# Patient Record
Sex: MALE | Race: WHITE | NOT HISPANIC OR LATINO | Employment: OTHER | URBAN - NONMETROPOLITAN AREA
[De-identification: names, ages, dates, MRNs, and addresses within clinical notes are randomized per-mention and may not be internally consistent; named-entity substitution may affect disease eponyms.]

---

## 2022-10-14 ENCOUNTER — HOSPITAL ENCOUNTER (EMERGENCY)
Facility: HOSPITAL | Age: 42
Discharge: HOME | End: 2022-10-14
Attending: EMERGENCY MEDICINE
Payer: COMMERCIAL

## 2022-10-14 ENCOUNTER — APPOINTMENT (OUTPATIENT)
Dept: RADIOLOGY | Facility: HOSPITAL | Age: 42
End: 2022-10-14
Payer: COMMERCIAL

## 2022-10-14 VITALS
HEART RATE: 63 BPM | OXYGEN SATURATION: 99 % | BODY MASS INDEX: 36.67 KG/M2 | TEMPERATURE: 98.5 F | DIASTOLIC BLOOD PRESSURE: 81 MMHG | HEIGHT: 71 IN | WEIGHT: 261.91 LBS | RESPIRATION RATE: 18 BRPM | SYSTOLIC BLOOD PRESSURE: 131 MMHG

## 2022-10-14 DIAGNOSIS — S61.215A LACERATION OF LEFT RING FINGER WITHOUT FOREIGN BODY WITHOUT DAMAGE TO NAIL, INITIAL ENCOUNTER: ICD-10-CM

## 2022-10-14 DIAGNOSIS — S61.213A LACERATION OF LEFT MIDDLE FINGER WITHOUT FOREIGN BODY WITHOUT DAMAGE TO NAIL, INITIAL ENCOUNTER: ICD-10-CM

## 2022-10-14 DIAGNOSIS — S61.221A LACERATION OF LEFT INDEX FINGER WITH FOREIGN BODY WITHOUT DAMAGE TO NAIL, INITIAL ENCOUNTER: ICD-10-CM

## 2022-10-14 DIAGNOSIS — S61.229A: Primary | ICD-10-CM

## 2022-10-14 PROCEDURE — 99283 EMERGENCY DEPT VISIT LOW MDM: CPT

## 2022-10-14 PROCEDURE — 12041 INTMD RPR N-HF/GENIT 2.5CM/<: CPT | Performed by: EMERGENCY MEDICINE

## 2022-10-14 PROCEDURE — 99283 EMERGENCY DEPT VISIT LOW MDM: CPT | Performed by: EMERGENCY MEDICINE

## 2022-10-14 PROCEDURE — 73130 X-RAY EXAM OF HAND: CPT

## 2022-10-14 RX ORDER — CEPHALEXIN 500 MG/1
500 CAPSULE ORAL EVERY 6 HOURS SCHEDULED
Qty: 20 CAPSULE | Refills: 0 | Status: SHIPPED | OUTPATIENT
Start: 2022-10-14 | End: 2022-10-19

## 2022-10-14 RX ORDER — BUPIVACAINE HYDROCHLORIDE 2.5 MG/ML
10 INJECTION, SOLUTION EPIDURAL; INFILTRATION; INTRACAUDAL ONCE
Status: COMPLETED | OUTPATIENT
Start: 2022-10-14 | End: 2022-10-14

## 2022-10-14 RX ORDER — LIDOCAINE HYDROCHLORIDE 10 MG/ML
10 INJECTION, SOLUTION EPIDURAL; INFILTRATION; INTRACAUDAL; PERINEURAL ONCE
Status: COMPLETED | OUTPATIENT
Start: 2022-10-14 | End: 2022-10-14

## 2022-10-14 RX ADMIN — LIDOCAINE HYDROCHLORIDE 10 ML: 10 INJECTION, SOLUTION EPIDURAL; INFILTRATION; INTRACAUDAL; PERINEURAL at 12:11

## 2022-10-14 RX ADMIN — BUPIVACAINE HYDROCHLORIDE 10 ML: 2.5 INJECTION, SOLUTION EPIDURAL; INFILTRATION; INTRACAUDAL; PERINEURAL at 12:11

## 2022-10-14 NOTE — ED PROVIDER NOTES
History  Chief Complaint   Patient presents with   • Laceration     Pt arrives reporting approx 30 min ago he was using circular saw and sustained laceration to right first 3 fingers  Pt denies amputation  Pt tetanus up to date  HPI  42M right hand dominant presenting with finger lacerations  He was using circular saw when the saw backed up and he sustained lacerations to his index, middle, and ring finger  Able to feel fingers  No previous hand surgeries  Not on blood thinners  Tetanus UTD  Past Medical History:   Diagnosis Date   • DVT (deep venous thrombosis) ([facility] [address] )        Past Surgical History:   Procedure Laterality Date   • MANDIBLE FRACTURE SURGERY         History reviewed  No pertinent family history  I have reviewed and agree with the history as documented  E-Cigarette/Vaping   • E-Cigarette Use Current Some Day User      E-Cigarette/Vaping Substances   • Nicotine Yes      Social History     Tobacco Use   • Smoking status: Never Smoker   • Smokeless tobacco: Current User     Types: Chew   Vaping Use   • Vaping Use: Some days   • Substances: Nicotine   Substance Use Topics   • Alcohol use: Not Currently   • Drug use: Never       Review of Systems   Musculoskeletal:        Hand pain   Skin: Positive for wound  Physical Exam  Physical Exam  Constitutional:       Appearance: Normal appearance  Cardiovascular:      Pulses:           Radial pulses are 2+ on the right side and 2+ on the left side  Skin:     Comments: Macerated laceration to tip of left index finger and distal tip of left ring finger  Superficial scratch to left middle finger  Nailplates intact  No exposure of bone   Neurological:      Mental Status: He is alert         Vital Signs  ED Triage Vitals   Temperature Pulse Respirations Blood Pressure SpO2   10/14/22 1204 10/14/22 1201 10/14/22 1201 10/14/22 1201 10/14/22 1201   98 5 °F (36 9 °C) 65 18 137/91 99 %      Temp Source Heart Rate Source Patient Position - Orthostatic VS BP Location FiO2 (%)   10/14/22 1204 10/14/22 1230 -- -- --   Temporal Monitor         Pain Score       10/14/22 1201       1           Vitals:    10/14/22 1201 10/14/22 1230   BP: 137/91 131/81   Pulse: 65 63         Visual Acuity      ED Medications  Medications   lidocaine (PF) (XYLOCAINE-MPF) 1 % injection 10 mL (10 mL Infiltration Given by Other 10/14/22 1211)   bupivacaine (PF) (MARCAINE) 0 25 % injection 10 mL (10 mL Infiltration Given by Other 10/14/22 1211)       Diagnostic Studies  Results Reviewed     None                 XR hand 3+ views LEFT   ED Interpretation by Aletha Murphy MD (10/14 1231)   No acute fractures      Final Result by Aaron Mosquera MD (10/14 1312)      2 mm foreign body  The study was marked in [facility] for immediate notification  Workstation performed: [id number]                    Procedures  Laceration repair    Date/Time: 10/14/2022 1:00 PM  Performed by: Aletha Murphy MD  Authorized by: Aletha Murphy MD   Consent: Verbal consent obtained  Risks and benefits: risks, benefits and alternatives were discussed  Patient understanding: patient states understanding of the procedure being performed  Body area: upper extremity  Location details: right hand  Foreign bodies: no foreign bodies  Tendon involvement: none  Nerve involvement: none  Vascular damage: no  Anesthesia: digital block    Anesthesia:  Local Anesthetic: lidocaine 1% without epinephrine and bupivacaine 0 25% without epinephrine    Wound Dehiscence:    Secondary closure or dehiscence: complex    Procedure Details:  Preparation: Patient was prepped and draped in the usual sterile fashion    Irrigation solution: tap water  Irrigation method: tap  Amount of cleaning: extensive  Debridement: minimal  Subcutaneous closure: 6-0 fast-absorbing plain gut  Technique: simple and running  Approximation: loose  Approximation difficulty: complex  Dressing: 4x4 sterile gauze        ED Course  ED Course as of 10/15/22 1542   Fri Oct 14, 2022   1358 Xray left hand  Impression: 2 mm foreign body       MDM  42M presenting with macerated lacerations to left index and ring finger  Also w superficial scratch to left middle finger  Left hand neurovascularly intact  Xray of left hand showed 2mm foreign body which patient informed of  Extensive irrigation was done after the Xray w/o any obvious foreign body  Lacerations were loosely closed as it was difficulty to approximate the borders given the macerated appearance  Wounds were wrapped w xeroform and gauze  Patient given Keflex for prophylaxis against infection  Patient advised to closely monitor for signs of infection  Referral to ortho hand was placed for outpatient follow-up given extensive lacerations  Patient discharged in stable condition      Disposition  Final diagnoses:   Laceration of finger of left hand with foreign body without damage to nail, unspecified finger, initial encounter   Laceration of left index finger with foreign body without damage to nail, initial encounter   Laceration of left middle finger without foreign body without damage to nail, initial encounter   Laceration of left ring finger without foreign body without damage to nail, initial encounter     Time reflects when diagnosis was documented in both MDM as applicable and the Disposition within this note     Time User Action Codes Description Comment    10/14/2022  2:00 PM Annel, [address] Laceration of finger of left hand with foreign body without damage to nail, unspecified finger, initial encounter     10/15/2022  3:41 PM Annel, [address] Laceration of left index finger with foreign body without damage to nail, initial encounter     10/15/2022  3:42 PM Vince Up Laceration of finger of left hand with foreign body without damage to nail, unspecified finger, initial encounter     10/15/2022  3:42 PM Vince Up [place] Laceration of left index finger with foreign body without damage to nail, initial encounter     10/15/2022  3:42 PM Vince Up Laceration of finger of left hand with foreign body without damage to nail, unspecified finger, initial encounter     10/15/2022  3:42 PM Vince Up Laceration of left index finger with foreign body without damage to nail, initial encounter     10/15/2022  3:42 PM Sarahy Carvajal Add [[id number]] Laceration of left middle finger without foreign body without damage to nail, initial encounter     10/15/2022  3:42 PM Sarahy Carvajal Add [V22 [id number]] Laceration of left ring finger without foreign body without damage to nail, initial encounter       ED Disposition     ED Disposition   Discharge    Condition   Stable    Date/Time   Fri Oct 14, 2022  1:58 PM    Comment   Kristen Arroyo discharge to home/self care                 Follow-up Information    None         Discharge Medication List as of 10/14/2022  2:04 PM      START taking these medications    Details   cephalexin (KEFLEX) 500 mg capsule Take 1 capsule (500 mg total) by mouth every 6 (six) hours for 5 days, Starting Fri 10/14/2022, Until Wed 10/19/2022, Normal                 PDMP Review     None          ED Provider  Electronically Signed by           Raysa Mariee MD  10/15/22 [id number]

## 2022-10-14 NOTE — DISCHARGE INSTRUCTIONS
There was a 2 mm foreign body seen on your index finger on the Xray  There were no broken fingers  Please take antibiotics to prevent infection  Your finger will scar over  Good wound care and follow-up are essential for proper healing, less scarring, and prevention of infection  The greatest risk of infection occurs with deep puncture wounds, splinters, or debris remaining in the wound  that cannot be easily found and in patients with diabetes or a weakened immune system  Check the wound  every day for signs of infection  Home Care:  1  Stitches (sutures) that will dissolve on their own: It is OK to wash today  After this, keep it clean and dry  Apply antibiotic ointment and a bandage twice a day  Stitches should fall out on their own  Do NOT soak your hand until the wounds are healed! See your doctor for a wound recheck if there is any question of infection or if not better  Call your doctor or return to the emergency department if worse or:  1  The wound opens or bleeds  2  Wound redness, swelling, or pus drainage occurs  3  Fever occurs  4  Pain worsens

## 2022-10-17 NOTE — TELEPHONE ENCOUNTER
Hello,  Please advise if the following patient can be forced onto the schedule:    Patient: Arlene Jackson    : 82    MRN: [id number]    Call back #: [phone] -wife    Insurance: [facility] [place]    Reason for appointment:index, middle, and ring finger IMPRESSION:     2 mm foreign body      The study was marked in EPIC for immediate notification    Requested doctor/location:      Thank you

## 2022-10-18 ENCOUNTER — OFFICE VISIT (OUTPATIENT)
Dept: OBGYN CLINIC | Facility: CLINIC | Age: 42
End: 2022-10-18
Payer: COMMERCIAL

## 2022-10-18 VITALS
HEIGHT: 71 IN | BODY MASS INDEX: 35 KG/M2 | SYSTOLIC BLOOD PRESSURE: 136 MMHG | DIASTOLIC BLOOD PRESSURE: 94 MMHG | WEIGHT: 250 LBS | HEART RATE: 64 BPM

## 2022-10-18 DIAGNOSIS — S56.129A FLEXOR TENDON LACERATION OF FINGER WITH OPEN WOUND, INITIAL ENCOUNTER: ICD-10-CM

## 2022-10-18 DIAGNOSIS — S61.209A FLEXOR TENDON LACERATION OF FINGER WITH OPEN WOUND, INITIAL ENCOUNTER: ICD-10-CM

## 2022-10-18 PROCEDURE — 99203 OFFICE O/P NEW LOW 30 MIN: CPT | Performed by: STUDENT IN AN ORGANIZED HEALTH CARE EDUCATION/TRAINING PROGRAM

## 2022-10-18 NOTE — PROGRESS NOTES
ORTHOPAEDIC HAND, WRIST, AND ELBOW OFFICE  VISIT       ASSESSMENT/PLAN:      Diagnoses and all orders for this visit:    Flexor tendon laceration of finger with open wound, initial encounter  -     Ambulatory Referral to Orthopedic Surgery      80-year-old male with left index finger FDP laceration and ulnar digital nerve injury  I had a long discussion with the patient and his wife  Non operative versus surgical intervention was discussed at length  Risks and benefits were discussed  The post operative course was also discussed  Discussed recommendation for FDP repair to optimize function  Discussed at length formal recommendation  After much discussion, the patient elected to proceed with non operative treatment at this time due to his job and so he may return to work  Additionally, he stated he would be unable to comply with postoperative regimen  He is aware he may develop a neuroma in the future  He is always aware he may have continued numbness and tingling  The patient was advised to continue to keep the lacerations clean and dry  He was instructed to wash with soap and water starting early next week  He will follow up in 2 weeks for repeat evaluation and suture removal      The patient verbalized understanding of exam findings and treatment plan  We engaged in the shared decision-making process and treatment options were discussed at length with the patient  Surgical and conservative management discussed today along with risks and benefits  Follow Up:  2 weeks       To Do Next Visit:  Re-evaluation of current issue and Sutures out      Operative Discussions:  Flexor Tendon Repair: The patient has elected to undergo operative repair of a lacerated flexor tendon  During surgery, an incision will be made in the palmar aspect of the hand which will be extended proximally to the level of tendon retraction  The tendon will be delivered distally, and sutured to the remaining portion of the tendon  Postoperatively, the splint will be applied in a protected position  Postoperative physical therapy for up to 12-16 weeks is a necessity to help improve outcomes  The risks of the surgery include tendon rupture, stiffness, pain, incomplete motion, and approximately a 20-30% chance for repeat surgery such as tenolysis  The risks and benefits of the procedure were explained to the patient, which include, but are not limited to: Bleeding, infection, recurrence, pain, scar, damage to tendons, damage to nerves, and damage to blood vessels, failure to give desired results and complications related to anesthesia  These risks, along with alternative conservative treatment options, and postoperative protocols were voiced back and understood by the patient  All questions were answered to the patient's satisfaction  The patient agrees to comply with a standard postoperative protocol, and is willing to proceed  Education was provided via written and auditory forms  There were no barriers to learning  Written handouts regarding wound care, incision and scar care, and general preoperative information was provided to the patient  Prior to surgery, the patient may be requested to stop all anti-inflammatory medications  Prophylactic aspirin, Plavix, and Coumadin may be allowed to be continued  Medications including vitamin E , ginkgo, and fish oil are requested to be stopped approximately one week prior to surgery  Hypertensive medications and beta blockers, if taken, should be continued  Citlaly Pablo MD  Attending, Orthopaedic Surgery  Hand, Wrist, and Elbow Surgery  [address] [facility]    ____________________________________________________________________________________________________________________________________________      CHIEF COMPLAINT:  No chief complaint on file        SUBJECTIVE:  Rik Damon is a 40y o  year old RHD male who presents today for evaluation and treatment of left index, long, and ring finger lacerations  The patient states on 10/14/22 he was using a circular saw when the saw backed up and he sustained lacerations to his left index, long, and ring fingers  He was evaluated at the ED after the injury where x-rays were taken and sutures were placed and he was discharged on Keflex  The patient states he has been complaint with the antibiotics  He has been performing daily dressing changes 2x's a day  He also notes numbness to his left index finger  The patient works as a contractor  Patient referred for evaluation by Liberty Corral MD     I have personally reviewed all the relevant PMH, PSH, SH, FH, Medications and allergies      PAST MEDICAL HISTORY:  Past Medical History:   Diagnosis Date   • DVT (deep venous thrombosis) ([facility] [address] )        PAST SURGICAL HISTORY:  Past Surgical History:   Procedure Laterality Date   • MANDIBLE FRACTURE SURGERY         FAMILY HISTORY:  History reviewed  No pertinent family history  SOCIAL HISTORY:  Social History     Tobacco Use   • Smoking status: Never Smoker   • Smokeless tobacco: Current User     Types: Chew   Vaping Use   • Vaping Use: Some days   • Substances: Nicotine   Substance Use Topics   • Alcohol use: Not Currently   • Drug use: Never       MEDICATIONS:    Current Outpatient Medications:   •  cephalexin (KEFLEX) 500 mg capsule, Take 1 capsule (500 mg total) by mouth every 6 (six) hours for 5 days, Disp: 20 capsule, Rfl: 0    ALLERGIES:  Allergies   Allergen Reactions   • Codeine Hives   • Oxycodone Other (See Comments)           REVIEW OF SYSTEMS:  Musculoskeletal:        As noted in HPI  All other systems reviewed and are negative      VITALS:  Vitals:    10/18/22 0949   BP: 136/94   Pulse: 64       LABS:  HgA1c: No results found for: HGBA1C  BMP: No results found for: GLUCOSE, CALCIUM, NA, K, CO2, CL, BUN, CREATININE    _____________________________________________________  PHYSICAL EXAMINATION:  General: well developed and well nourished, alert, oriented times 3 and appears comfortable  Psychiatric: Normal  HEENT: Normocephalic, Atraumatic Trachea Midline, No torticollis  Pulmonary: No audible wheezing or respiratory distress   Abdomen/GI: Non tender, non distended   Cardiovascular: No pitting edema, 2+ radial pulse   Skin: No Erythema, No Fluctuation, No Ulcerations  Neurovascular: Motor Intact to the Median, Ulnar, Radial Nerve and Pulses Intact  Musculoskeletal: Normal, except as noted in detailed exam and in HPI  MUSCULOSKELETAL EXAMINATION:  Left hand  Lacerations healing well  No erythema or signs of infection  Decreased sensation to light touch to index ulnar aspect of digit  Compartments soft  No evidence of FDP function after digital block with 5 cc of marcaine 0 25% without epinephrine  Right radial (mm) Right ulnar (mm) Left radial (mm) Left ulnar (mm)   Thumb   5 5   Index   5 Out at 8   Long   5 5   Ring   Not tested due to laceration being too distal Not tested due to laceration being too distal   Small   5 5         ___________________________________________________  STUDIES REVIEWED:  Images of the left hand were reviewed in PACS by Dr Guerrero Westfall and demonstrate  no fractures or dislocations  2 mm curvilinear radiopaque foreign body adjacent to the 2nd middle phalanx          PROCEDURES PERFORMED:  Procedures  No Procedures performed today    _____________________________________________________      Scribe Attestation    I,:  Kareen Manuel MA am acting as a scribe while in the presence of the attending physician :       I,:  Cecelia Fuller MD personally performed the services described in this documentation    as scribed in my presence :

## 2023-07-31 ENCOUNTER — HOSPITAL ENCOUNTER (EMERGENCY)
Facility: HOSPITAL | Age: 43
Discharge: HOME | End: 2023-07-31
Attending: EMERGENCY MEDICINE
Payer: COMMERCIAL

## 2023-07-31 VITALS
TEMPERATURE: 98 F | OXYGEN SATURATION: 93 % | DIASTOLIC BLOOD PRESSURE: 55 MMHG | HEIGHT: 71 IN | HEART RATE: 92 BPM | SYSTOLIC BLOOD PRESSURE: 104 MMHG | WEIGHT: 270 LBS | BODY MASS INDEX: 37.8 KG/M2 | RESPIRATION RATE: 17 BRPM

## 2023-07-31 DIAGNOSIS — T63.441A ALLERGIC REACTION TO BEE STING: Primary | ICD-10-CM

## 2023-07-31 PROCEDURE — 96374 THER/PROPH/DIAG INJ IV PUSH: CPT

## 2023-07-31 PROCEDURE — 96361 HYDRATE IV INFUSION ADD-ON: CPT

## 2023-07-31 PROCEDURE — 96375 TX/PRO/DX INJ NEW DRUG ADDON: CPT

## 2023-07-31 PROCEDURE — 99284 EMERGENCY DEPT VISIT MOD MDM: CPT | Performed by: EMERGENCY MEDICINE

## 2023-07-31 PROCEDURE — 99282 EMERGENCY DEPT VISIT SF MDM: CPT

## 2023-07-31 PROCEDURE — 96372 THER/PROPH/DIAG INJ SC/IM: CPT

## 2023-07-31 RX ORDER — FAMOTIDINE 10 MG/ML
20 INJECTION, SOLUTION INTRAVENOUS ONCE
Status: COMPLETED | OUTPATIENT
Start: 2023-07-31 | End: 2023-07-31

## 2023-07-31 RX ORDER — DIPHENHYDRAMINE HYDROCHLORIDE 50 MG/ML
25 INJECTION INTRAMUSCULAR; INTRAVENOUS ONCE
Status: COMPLETED | OUTPATIENT
Start: 2023-07-31 | End: 2023-07-31

## 2023-07-31 RX ORDER — PREDNISONE 20 MG/1
40 TABLET ORAL DAILY
Qty: 5 TABLET | Refills: 0 | Status: SHIPPED | OUTPATIENT
Start: 2023-07-31

## 2023-07-31 RX ORDER — FAMOTIDINE 20 MG/1
20 TABLET, FILM COATED ORAL 2 TIMES DAILY
Qty: 10 TABLET | Refills: 0 | Status: SHIPPED | OUTPATIENT
Start: 2023-07-31 | End: 2023-08-05

## 2023-07-31 RX ORDER — EPINEPHRINE 1 MG/ML
0.5 INJECTION, SOLUTION, CONCENTRATE INTRAVENOUS ONCE
Status: COMPLETED | OUTPATIENT
Start: 2023-07-31 | End: 2023-07-31

## 2023-07-31 RX ORDER — EPINEPHRINE 0.3 MG/.3ML
0.3 INJECTION SUBCUTANEOUS ONCE
Qty: 0.6 ML | Refills: 0 | Status: SHIPPED | OUTPATIENT
Start: 2023-07-31 | End: 2023-07-31

## 2023-07-31 RX ORDER — METHYLPREDNISOLONE SODIUM SUCCINATE 125 MG/2ML
125 INJECTION, POWDER, LYOPHILIZED, FOR SOLUTION INTRAMUSCULAR; INTRAVENOUS ONCE
Status: COMPLETED | OUTPATIENT
Start: 2023-07-31 | End: 2023-07-31

## 2023-07-31 RX ADMIN — METHYLPREDNISOLONE SODIUM SUCCINATE 125 MG: 125 INJECTION, POWDER, FOR SOLUTION INTRAMUSCULAR; INTRAVENOUS at 20:59

## 2023-07-31 RX ADMIN — SODIUM CHLORIDE 1000 ML: 0.9 INJECTION, SOLUTION INTRAVENOUS at 20:59

## 2023-07-31 RX ADMIN — DIPHENHYDRAMINE HYDROCHLORIDE 25 MG: 50 INJECTION, SOLUTION INTRAMUSCULAR; INTRAVENOUS at 20:58

## 2023-07-31 RX ADMIN — EPINEPHRINE 0.5 MG: 1 INJECTION, SOLUTION, CONCENTRATE INTRAVENOUS at 20:57

## 2023-07-31 RX ADMIN — FAMOTIDINE 20 MG: 10 INJECTION, SOLUTION INTRAVENOUS at 20:59

## 2023-08-01 NOTE — ED PROVIDER NOTES
History  Chief Complaint   Patient presents with   • Bee Sting     Multiple bee stings with hx of allergy. Generalized hives and itching, reports tingling to all extremities and lips. Airway is patent. Took 1 Benadryl PTA. Patient is a 52-year-old male presenting to the emergency department complaining of bee sting that occurred just prior to arrival, reports he feels hives diffusely throughout his body, states he had some swelling in his lips and the sensation of swelling in his throat, he did take oral Benadryl prior to coming, symptoms have improved slightly, he does have a history of a reaction to bee sting previously where his whole arm swelled up, he denies any symptoms prior to bee sting, no shortness of breath          None       Past Medical History:   Diagnosis Date   • DVT (deep venous thrombosis) ([address])        Past Surgical History:   Procedure Laterality Date   • MANDIBLE FRACTURE SURGERY         History reviewed. No pertinent family history. I have reviewed and agree with the history as documented. E-Cigarette/Vaping   • E-Cigarette Use Current Some Day User      E-Cigarette/Vaping Substances   • Nicotine Yes      Social History     Tobacco Use   • Smoking status: Never   • Smokeless tobacco: Current     Types: Chew   Vaping Use   • Vaping Use: Some days   • Substances: Nicotine   Substance Use Topics   • Alcohol use: Not Currently   • Drug use: Never       Review of Systems   Constitutional: Negative. HENT: Positive for facial swelling and trouble swallowing. Eyes: Negative. Respiratory: Negative. Cardiovascular: Negative. Gastrointestinal: Negative. Endocrine: Negative. Genitourinary: Negative. Musculoskeletal: Negative. Skin: Positive for rash. Allergic/Immunologic: Negative. Neurological: Negative. Hematological: Negative. Psychiatric/Behavioral: Negative. Physical Exam  Physical Exam  Constitutional:       Appearance: He is well-developed. HENT:      Head: Normocephalic and atraumatic. Comments: Small area of induration to the right scalp consistent with bee sting     Mouth/Throat:      Pharynx: Oropharynx is clear. Uvula midline. Comments: Airway patent  Eyes:      Conjunctiva/sclera: Conjunctivae normal.      Pupils: Pupils are equal, round, and reactive to light. Cardiovascular:      Rate and Rhythm: Normal rate. Pulmonary:      Effort: Pulmonary effort is normal.      Breath sounds: Normal breath sounds and air entry. Abdominal:      Palpations: Abdomen is soft. Musculoskeletal:         General: Normal range of motion. Cervical back: Normal range of motion and neck supple. Skin:     General: Skin is warm and dry. Comments: Diffuse erythematous urticarial rash   Neurological:      Mental Status: He is alert and oriented to person, place, and time.          Vital Signs  ED Triage Vitals [07/31/23 2055]   Temperature Pulse Respirations Blood Pressure SpO2   98 °F (36.7 °C) (!) 112 (!) 27 141/76 95 %      Temp Source Heart Rate Source Patient Position - Orthostatic VS BP Location FiO2 (%)   Temporal Right;Radial Sitting Right arm --      Pain Score       No Pain           Vitals:    07/31/23 2055 07/31/23 2130 07/31/23 2200   BP: 141/76 102/55 102/50   Pulse: (!) 112 96 97   Patient Position - Orthostatic VS: Sitting           Visual Acuity      ED Medications  Medications   sodium chloride 0.9 % bolus 1,000 mL (1,000 mL Intravenous New Bag 7/31/23 2059)   diphenhydrAMINE (BENADRYL) injection 25 mg (25 mg Intravenous Given 7/31/23 2058)   Famotidine (PF) (PEPCID) injection 20 mg (20 mg Intravenous Given 7/31/23 2059)   methylPREDNISolone sodium succinate (Solu-MEDROL) injection 125 mg (125 mg Intravenous Given 7/31/23 2059)   EPINEPHrine PF (ADRENALIN) 1 mg/mL injection 0.5 mg (0.5 mg Intramuscular Given 7/31/23 2057)       Diagnostic Studies  Results Reviewed     None                 No orders to display Procedures  Procedures         ED Course  ED Course as of 07/31/23 2312   Mon Jul 31, 2023   2137 Patient sleeping comfortably, stable vital signs, no worsening of symptoms   2212 Patient still resting comfortably, stable vital signs, symptoms improved                               SBIRT 20yo+    Flowsheet Row Most Recent Value   Initial Alcohol Screen: US AUDIT-C     1. How often do you have a drink containing alcohol? 0 Filed at: 07/31/2023 2105   2. How many drinks containing alcohol do you have on a typical day you are drinking? 0 Filed at: 07/31/2023 2105   3a. Male UNDER 65: How often do you have five or more drinks on one occasion? 0 Filed at: 07/31/2023 2105   3b. FEMALE Any Age, or MALE 65+: How often do you have 4 or more drinks on one occassion? 0 Filed at: 07/31/2023 2105   Audit-C Score 0 Filed at: 07/31/2023 2105   PHOEBE: How many times in the past year have you. .. Used an illegal drug or used a prescription medication for non-medical reasons? Never Filed at: 07/31/2023 2105                    Medical Decision Making  The patient presents with symptoms consistent with acute hypersensitivity reaction, likely acute allergic reaction to bee sting. Presentation not consistent with acute anaphylaxis (lack of pulmonary, cardiovascular or GI symptoms, lack of hypotension), angioedema, serum sickness (no recent drug exposure, lacks fevers, arthralgias). No evidence of airway compromise or shock at this time. Patient improved with H1/H2 blockers, steroids and single dose of epinephrine. Prescribe patient EpiPen Rx and additional medications for treatment of acute allergic reaction, and to follow-up with PMD or specialist for further testing and treatment. Allergic reaction to bee sting: acute illness or injury  Risk  OTC drugs. Prescription drug management. Disposition  Final diagnoses:    Allergic reaction to bee sting     Time reflects when diagnosis was documented in both MDM as applicable and the Disposition within this note     Time User Action Codes Description Comment    7/31/2023 11:07 PM Massimo Giang Add [C42.517S] Allergic reaction to bee sting       ED Disposition     ED Disposition   Discharge    Condition   Stable    Date/Time   Mon Jul 31, 2023 11:07 PM    Comment   Marcell Nikki discharge to home/self care. Follow-up Information     Follow up With Specialties Details Why [facility], DO Family Medicine  As needed [place] [id number] [facility]  [phone]            Patient's Medications   Discharge Prescriptions    EPINEPHRINE (EPIPEN) 0.3 MG/0.3 ML SOAJ    Inject 0.3 mL (0.3 mg total) into a muscle once for 1 dose       Start Date: 7/31/2023 End Date: 7/31/2023       Order Dose: 0.3 mg       Quantity: 0.6 mL    Refills: 0    FAMOTIDINE (PEPCID) 20 MG TABLET    Take 1 tablet (20 mg total) by mouth 2 (two) times a day for 5 days       Start Date: 7/31/2023 End Date: 8/5/2023       Order Dose: 20 mg       Quantity: 10 tablet    Refills: 0    PREDNISONE 20 MG TABLET    Take 2 tablets (40 mg total) by mouth daily       Start Date: 7/31/2023 End Date: --       Order Dose: 40 mg       Quantity: 5 tablet    Refills: 0       No discharge procedures on file.     PDMP Review     None          ED Provider  Electronically Signed by           Massimo Giang DO  07/31/23 7231

## 2024-09-02 ENCOUNTER — HOSPITAL ENCOUNTER (EMERGENCY)
Facility: HOSPITAL | Age: 44
Discharge: HOME | End: 2024-09-02
Attending: EMERGENCY MEDICINE
Payer: COMMERCIAL

## 2024-09-02 VITALS
BODY MASS INDEX: 35 KG/M2 | TEMPERATURE: 97.4 F | DIASTOLIC BLOOD PRESSURE: 96 MMHG | SYSTOLIC BLOOD PRESSURE: 140 MMHG | RESPIRATION RATE: 18 BRPM | HEIGHT: 71 IN | WEIGHT: 250 LBS | HEART RATE: 68 BPM | OXYGEN SATURATION: 99 %

## 2024-09-02 DIAGNOSIS — S05.01XA ABRASION OF RIGHT CORNEA, INITIAL ENCOUNTER: Primary | ICD-10-CM

## 2024-09-02 PROCEDURE — 99284 EMERGENCY DEPT VISIT MOD MDM: CPT | Performed by: EMERGENCY MEDICINE

## 2024-09-02 PROCEDURE — 99283 EMERGENCY DEPT VISIT LOW MDM: CPT

## 2024-09-02 RX ORDER — TETRACAINE HYDROCHLORIDE 5 MG/ML
1 SOLUTION OPHTHALMIC ONCE
Status: COMPLETED | OUTPATIENT
Start: 2024-09-02 | End: 2024-09-02

## 2024-09-02 RX ORDER — CIPROFLOXACIN HYDROCHLORIDE 3.5 MG/ML
1 SOLUTION/ DROPS TOPICAL ONCE
Status: COMPLETED | OUTPATIENT
Start: 2024-09-02 | End: 2024-09-02

## 2024-09-02 RX ADMIN — TETRACAINE HYDROCHLORIDE 1 DROP: 5 SOLUTION OPHTHALMIC at 19:17

## 2024-09-02 RX ADMIN — CIPROFLOXACIN HYDROCHLORIDE 1 DROP: 3 SOLUTION/ DROPS OPHTHALMIC at 19:29

## 2024-09-02 NOTE — DISCHARGE INSTRUCTIONS
Please use 1 drop in the right eye every 4 hours while awake.  Please follow-up with your ophthalmologist or the ophthalmologist listed on the handout provided to you  
: Yes

## 2024-09-02 NOTE — ED PROVIDER NOTES
History  Chief Complaint   Patient presents with    Eye Pain     Patient reports yesterday R eye felt funny and this AM he woke up and it felt worse and was swollen/irritated.      Patient complains of right eye pain since yesterday.  States he is not sure if something is in his eye.  Complains of redness and pain and swelling.  No visual change.  Does wear contacts.      History provided by:  Patient   used: No    Eye Pain  Location:  Right eye  Quality:  Redness and foreign body sensation  Severity:  Moderate  Onset quality:  Gradual  Duration:  1 day  Timing:  Constant  Progression:  Unchanged  Chronicity:  New  Relieved by:  Nothing  Worsened by:  Nothing  Associated symptoms: no abdominal pain, no chest pain, no cough, no diarrhea, no ear pain, no fever, no headaches, no nausea, no rash, no shortness of breath, no sore throat, no vomiting and no wheezing        Prior to Admission Medications   Prescriptions Last Dose Informant Patient Reported? Taking?   EPINEPHrine (EPIPEN) 0.3 mg/0.3 mL SOAJ   No No   Sig: Inject 0.3 mL (0.3 mg total) into a muscle once for 1 dose   famotidine (PEPCID) 20 mg tablet   No No   Sig: Take 1 tablet (20 mg total) by mouth 2 (two) times a day for 5 days   predniSONE 20 mg tablet   No No   Sig: Take 2 tablets (40 mg total) by mouth daily      Facility-Administered Medications: None       Past Medical History:   Diagnosis Date    DVT (deep venous thrombosis) (HCC)        Past Surgical History:   Procedure Laterality Date    MANDIBLE FRACTURE SURGERY         History reviewed. No pertinent family history.  I have reviewed and agree with the history as documented.    E-Cigarette/Vaping    E-Cigarette Use Former User      E-Cigarette/Vaping Substances    Nicotine Yes      Social History     Tobacco Use    Smoking status: Never    Smokeless tobacco: Current     Types: Chew   Vaping Use    Vaping status: Former    Substances: Nicotine   Substance Use Topics    Alcohol  use: Yes     Comment: Daily    Drug use: Never       Review of Systems   Constitutional:  Negative for chills and fever.   HENT:  Negative for ear pain, hearing loss, sore throat, trouble swallowing and voice change.    Eyes:  Positive for pain. Negative for discharge.   Respiratory:  Negative for cough, shortness of breath and wheezing.    Cardiovascular:  Negative for chest pain and palpitations.   Gastrointestinal:  Negative for abdominal pain, blood in stool, constipation, diarrhea, nausea and vomiting.   Genitourinary:  Negative for dysuria, flank pain, frequency and hematuria.   Musculoskeletal:  Negative for joint swelling, neck pain and neck stiffness.   Skin:  Negative for rash and wound.   Neurological:  Negative for dizziness, seizures, syncope, facial asymmetry and headaches.   Psychiatric/Behavioral:  Negative for hallucinations, self-injury and suicidal ideas.    All other systems reviewed and are negative.      Physical Exam  Physical Exam  Vitals and nursing note reviewed.   Constitutional:       General: He is not in acute distress.     Appearance: Normal appearance. He is well-developed. He is not ill-appearing or diaphoretic.   HENT:      Head: Normocephalic and atraumatic.      Right Ear: External ear normal.      Left Ear: External ear normal.   Eyes:      General: No scleral icterus.        Right eye: No discharge.         Left eye: No discharge.      Extraocular Movements: Extraocular movements intact.      Conjunctiva/sclera: Conjunctivae normal.      Comments: Right eye with diffuse injection and swelling of the upper and lower lids.  Lids flipped and no foreign bodies noted.  No foreign bodies on direct visual examination.  Pupil is normal and normally reactive.  Conjunctiva is injected.  Examined under fluorescein staining with Woods lamp noted to have small corneal abrasion in the inferior lateral portion of the eye.  No Vivek sign.   Pulmonary:      Effort: Pulmonary effort is normal.  No respiratory distress.   Musculoskeletal:         General: No swelling or deformity. Normal range of motion.      Cervical back: Normal range of motion and neck supple.   Skin:     General: Skin is dry.      Coloration: Skin is not jaundiced or pale.      Findings: No rash.   Neurological:      General: No focal deficit present.      Mental Status: He is alert and oriented to person, place, and time.      Cranial Nerves: No cranial nerve deficit.      Motor: No weakness.      Coordination: Coordination normal.      Gait: Gait normal.   Psychiatric:         Mood and Affect: Mood normal.         Behavior: Behavior normal.         Thought Content: Thought content normal.         Judgment: Judgment normal.         Vital Signs  ED Triage Vitals [09/02/24 1907]   Temperature Pulse Respirations Blood Pressure SpO2   (!) 97.4 °F (36.3 °C) 68 18 140/96 99 %      Temp Source Heart Rate Source Patient Position - Orthostatic VS BP Location FiO2 (%)   Temporal Monitor Sitting Left arm --      Pain Score       5           Vitals:    09/02/24 1907   BP: 140/96   Pulse: 68   Patient Position - Orthostatic VS: Sitting         Visual Acuity      ED Medications  Medications   tetracaine 0.5 % ophthalmic solution 1 drop (1 drop Right Eye Given by Other 9/2/24 1917)   ciprofloxacin (CILOXAN) 0.3 % ophthalmic solution 1 drop (1 drop Right Eye Given 9/2/24 1929)       Diagnostic Studies  Results Reviewed       None                   No orders to display              Procedures  Procedures         ED Course                                 SBIRT 22yo+      Flowsheet Row Most Recent Value   Initial Alcohol Screen: US AUDIT-C     1. How often do you have a drink containing alcohol? 6 Filed at: 09/02/2024 1907   Audit-C Score 6 Filed at: 09/02/2024 1907   PHOEBE: How many times in the past year have you...    Used an illegal drug or used a prescription medication for non-medical reasons? Never Filed at: 09/02/2024 1907                       Medical Decision Making  Based on the history and medical screening exam performed the may be at risk for conjunctivitis, foreign body, corneal abrasion, iritis, other inflammatory process.    Based on the work-up performed in the emergency room which includes physical examination, and which may include laboratory studies and imaging as warranted including advanced imaging such as CT scan or ultrasound, the diagnostic considerations are narrowed to exclude limb or life-threatening process.    The patient is stable for discharge.  Possible small corneal abrasion noted on examination.  Patient advised to follow-up with his ophthalmologist or the local ophthalmologist and outpatient follow-up information provided.  Patient provided with antibiotic drops for the eye.  Advised not to use contacts until cleared by ophthalmologist                 Disposition  Final diagnoses:   Abrasion of right cornea, initial encounter     Time reflects when diagnosis was documented in both MDM as applicable and the Disposition within this note       Time User Action Codes Description Comment    9/2/2024  7:26 PM Jose R Stephens Add [S05.01XA] Abrasion of right cornea, initial encounter           ED Disposition       ED Disposition   Discharge    Condition   Stable    Date/Time   Mon Sep 2, 2024 1926    Comment   Riley Feliciano discharge to home/self care.                   Follow-up Information       Follow up With Specialties Details Why Contact Info    Mauri Noble,  Family Medicine   [address] [facility] [address]  [phone]              Discharge Medication List as of 9/2/2024  7:27 PM        CONTINUE these medications which have NOT CHANGED    Details   EPINEPHrine (EPIPEN) 0.3 mg/0.3 mL SOAJ Inject 0.3 mL (0.3 mg total) into a muscle once for 1 dose, Starting Mon 7/31/2023, Normal      famotidine (PEPCID) 20 mg tablet Take 1 tablet (20 mg total) by mouth 2 (two) times a day for 5 days, Starting  Mon 7/31/2023, Until Sat 8/5/2023, Normal      predniSONE 20 mg tablet Take 2 tablets (40 mg total) by mouth daily, Starting Mon 7/31/2023, Normal             No discharge procedures on file.    PDMP Review       None            ED Provider  Electronically Signed by             Jose R Stephens MD  09/02/24 3265